# Patient Record
Sex: FEMALE | Race: WHITE | NOT HISPANIC OR LATINO | URBAN - METROPOLITAN AREA
[De-identification: names, ages, dates, MRNs, and addresses within clinical notes are randomized per-mention and may not be internally consistent; named-entity substitution may affect disease eponyms.]

---

## 2017-01-04 ENCOUNTER — TRANSFERRED RECORDS (OUTPATIENT)
Dept: HEALTH INFORMATION MANAGEMENT | Facility: CLINIC | Age: 32
End: 2017-01-04

## 2017-08-29 ENCOUNTER — COMMUNICATION - HEALTHEAST (OUTPATIENT)
Dept: SCHEDULING | Facility: CLINIC | Age: 32
End: 2017-08-29

## 2018-09-14 ENCOUNTER — TRANSFERRED RECORDS (OUTPATIENT)
Dept: HEALTH INFORMATION MANAGEMENT | Facility: CLINIC | Age: 33
End: 2018-09-14

## 2018-09-26 ENCOUNTER — TRANSFERRED RECORDS (OUTPATIENT)
Dept: HEALTH INFORMATION MANAGEMENT | Facility: CLINIC | Age: 33
End: 2018-09-26

## 2019-02-17 ENCOUNTER — COMMUNICATION - HEALTHEAST (OUTPATIENT)
Dept: SCHEDULING | Facility: CLINIC | Age: 34
End: 2019-02-17

## 2019-11-18 ENCOUNTER — COMMUNICATION - HEALTHEAST (OUTPATIENT)
Dept: FAMILY MEDICINE | Facility: CLINIC | Age: 34
End: 2019-11-18

## 2020-06-23 ENCOUNTER — TRANSFERRED RECORDS (OUTPATIENT)
Dept: HEALTH INFORMATION MANAGEMENT | Facility: CLINIC | Age: 35
End: 2020-06-23

## 2020-06-23 ENCOUNTER — RECORDS - HEALTHEAST (OUTPATIENT)
Dept: ADMINISTRATIVE | Facility: OTHER | Age: 35
End: 2020-06-23

## 2020-07-03 ENCOUNTER — HOSPITAL ENCOUNTER (OUTPATIENT)
Dept: NUCLEAR MEDICINE | Facility: CLINIC | Age: 35
Discharge: HOME | End: 2020-07-03
Attending: INTERNAL MEDICINE

## 2020-07-03 ENCOUNTER — TRANSFERRED RECORDS (OUTPATIENT)
Dept: HEALTH INFORMATION MANAGEMENT | Facility: CLINIC | Age: 35
End: 2020-07-03

## 2020-07-03 DIAGNOSIS — K31.9 GASTROPATHY: ICD-10-CM

## 2020-07-09 ENCOUNTER — TRANSFERRED RECORDS (OUTPATIENT)
Dept: HEALTH INFORMATION MANAGEMENT | Facility: CLINIC | Age: 35
End: 2020-07-09

## 2020-07-16 ENCOUNTER — TRANSFERRED RECORDS (OUTPATIENT)
Dept: HEALTH INFORMATION MANAGEMENT | Facility: CLINIC | Age: 35
End: 2020-07-16

## 2020-07-23 ENCOUNTER — TRANSFERRED RECORDS (OUTPATIENT)
Dept: HEALTH INFORMATION MANAGEMENT | Facility: CLINIC | Age: 35
End: 2020-07-23

## 2020-07-23 ENCOUNTER — MEDICAL CORRESPONDENCE (OUTPATIENT)
Dept: HEALTH INFORMATION MANAGEMENT | Facility: CLINIC | Age: 35
End: 2020-07-23

## 2020-07-27 ENCOUNTER — TELEPHONE (OUTPATIENT)
Dept: GASTROENTEROLOGY | Facility: CLINIC | Age: 35
End: 2020-07-27

## 2020-07-28 NOTE — TELEPHONE ENCOUNTER
RECORDS RECEIVED FROM: N/A   DATE RECEIVED: 2020   NOTES STATUS DETAILS   OFFICE NOTE from referring provider N/A    OFFICE NOTE from other specialist Received/ Care Everywhere 2020 Nurse Triage ([facility])   3/31/2020, 2020 Office visit with Dr. Carmen Crabtree ([facility])     [facility]:  - 2020, 18 Office visit with Dr. Chacorta Michelle   - 11/3/17 Office visit with Dr. Sebastian Mcguire   - 17 Office visit with Cyndee Ignacio PA-C  - 16 Office visit with Dr. Cheryl Garcia   - 10/26/19 Office visit with Dr. Keesha Pimentel    DISCHARGE SUMMARY from hospital Care Everywhere 2020, 18, 18, 10/29/17 ([facility])- more ED visits in Care Everywhere  2020 ([facility])    OPERATIVE REPORT Care Everywhere/ In process    MEDICATION LIST Care Everywhere         ENDOSCOPY  Received EGD: 2020, 18, 17 ([facility])    COLONOSCOPY Care Everywhere/ Received  17 ([facility])      ERCP N/A    EUS N/A    STOOL TESTING Care Everywhere 2020   PERTINENT LABS Care Everywhere    PATHOLOGY REPORTS (RELATED) Received 18, 17 ([facility])    IMAGING (CT, MRI, EGD) Care Everywhere [facility]:  - CTA Abdomen Pelvis: 2020  - NM Gastric Emptyin/3/2020  - US Abdomen: 2020, 18, 18  - CT Abdomen Pelvis: 18, 18, 18, 18  - CT Abdomen Pelvis Enterography: 10/28/15    [facility]:  - CT Abdomen Pelvis: 2020    [facility]:  - CT Abdomen Pelvis: 2020     REFERRAL INFORMATION    Date referral was placed: 2020   Date all records received: N/A   Date records were scanned into Epic: N/A   Date records were sent to Provider to review: N/A   Date and recommendation received from provider:  LETTER SENT  SCHEDULE APPOINTMENT   Date patient was contacted to schedule: 2020     Action 2020 10:31am -Lupe   Action Taken Fax request sent to [facility] ([phone]) for recs.     3:41pm Recs received from  [facility] and sent to scan.

## 2020-08-17 ENCOUNTER — VIRTUAL VISIT (OUTPATIENT)
Dept: GASTROENTEROLOGY | Facility: CLINIC | Age: 35
End: 2020-08-17
Payer: COMMERCIAL

## 2020-08-17 ENCOUNTER — PRE VISIT (OUTPATIENT)
Dept: GASTROENTEROLOGY | Facility: CLINIC | Age: 35
End: 2020-08-17

## 2020-08-17 VITALS — WEIGHT: 293 LBS | HEIGHT: 72 IN | BODY MASS INDEX: 39.68 KG/M2

## 2020-08-17 DIAGNOSIS — R10.84 ABDOMINAL PAIN, GENERALIZED: ICD-10-CM

## 2020-08-17 DIAGNOSIS — R16.2 HEPATOSPLENOMEGALY: Primary | ICD-10-CM

## 2020-08-17 RX ORDER — MECLIZINE HYDROCHLORIDE 25 MG/1
25 TABLET ORAL
COMMUNITY
Start: 2020-01-31

## 2020-08-17 RX ORDER — ACETAMINOPHEN 500 MG
1000 TABLET ORAL
COMMUNITY
Start: 2019-02-25

## 2020-08-17 RX ORDER — MAGNESIUM OXIDE 400 MG/1
400 TABLET ORAL
COMMUNITY
Start: 2020-07-06

## 2020-08-17 RX ORDER — HYDROXYZINE HYDROCHLORIDE 25 MG/1
25-50 TABLET, FILM COATED ORAL
COMMUNITY
Start: 2019-11-07

## 2020-08-17 RX ORDER — GLIPIZIDE 10 MG/1
10 TABLET, FILM COATED, EXTENDED RELEASE ORAL
COMMUNITY
Start: 2020-07-14

## 2020-08-17 RX ORDER — PIOGLITAZONEHYDROCHLORIDE 30 MG/1
30 TABLET ORAL
COMMUNITY
Start: 2019-03-18

## 2020-08-17 RX ORDER — PEN NEEDLE, DIABETIC 32 GX 1/4"
NEEDLE, DISPOSABLE MISCELLANEOUS
COMMUNITY
Start: 2020-08-03

## 2020-08-17 RX ORDER — INSULIN PUMP SYRINGE, 3 ML
EACH MISCELLANEOUS
COMMUNITY
Start: 2020-01-14

## 2020-08-17 RX ORDER — NICOTINE 21 MG/24HR
1 PATCH, TRANSDERMAL 24 HOURS TRANSDERMAL
COMMUNITY
Start: 2019-11-06 | End: 2021-01-07

## 2020-08-17 RX ORDER — DEXTROMETHORPHAN HBR. AND GUAIFENESIN 10; 100 MG/5ML; MG/5ML
SOLUTION ORAL
COMMUNITY
Start: 2020-03-25 | End: 2021-01-07

## 2020-08-17 RX ORDER — ALLOPURINOL 300 MG/1
300 TABLET ORAL
COMMUNITY
Start: 2019-03-18

## 2020-08-17 RX ORDER — METFORMIN HCL 500 MG
1000 TABLET, EXTENDED RELEASE 24 HR ORAL
COMMUNITY
Start: 2019-03-18

## 2020-08-17 RX ORDER — METOPROLOL SUCCINATE 25 MG/1
25 TABLET, EXTENDED RELEASE ORAL
COMMUNITY
Start: 2019-11-01

## 2020-08-17 RX ORDER — BLOOD SUGAR DIAGNOSTIC
STRIP MISCELLANEOUS
COMMUNITY
Start: 2020-01-17

## 2020-08-17 RX ORDER — MAGNESIUM HYDROXIDE/ALUMINUM HYDROXICE/SIMETHICONE 120; 1200; 1200 MG/30ML; MG/30ML; MG/30ML
30 SUSPENSION ORAL
COMMUNITY
Start: 2020-03-31

## 2020-08-17 RX ORDER — IBUPROFEN 600 MG/1
1 TABLET ORAL
COMMUNITY
Start: 2020-02-17

## 2020-08-17 RX ORDER — LURASIDONE HYDROCHLORIDE 40 MG/1
TABLET, FILM COATED ORAL
COMMUNITY
Start: 2020-07-22

## 2020-08-17 RX ORDER — SUCRALFATE 1 G/1
1 TABLET ORAL
COMMUNITY
Start: 2020-06-23

## 2020-08-17 RX ORDER — ALBUTEROL SULFATE 90 UG/1
1-2 AEROSOL, METERED RESPIRATORY (INHALATION)
COMMUNITY
Start: 2020-08-17

## 2020-08-17 RX ORDER — DIPHENHYDRAMINE HCL 25 MG
25 TABLET ORAL
COMMUNITY
End: 2021-01-07

## 2020-08-17 RX ORDER — LANOLIN ALCOHOL/MO/W.PET/CERES
3 CREAM (GRAM) TOPICAL
COMMUNITY
Start: 2019-02-15 | End: 2021-01-07

## 2020-08-17 RX ORDER — BLOOD SUGAR DIAGNOSTIC
1 STRIP MISCELLANEOUS
COMMUNITY
Start: 2019-02-15

## 2020-08-17 RX ORDER — PANTOPRAZOLE SODIUM 20 MG/1
20 TABLET, DELAYED RELEASE ORAL
COMMUNITY
Start: 2020-06-08

## 2020-08-17 RX ORDER — LAMOTRIGINE 100 MG/1
100 TABLET ORAL
COMMUNITY
Start: 2019-03-18

## 2020-08-17 RX ORDER — DEXTROMETHORPHAN POLISTIREX 30 MG/5ML
60 SUSPENSION ORAL
COMMUNITY
Start: 2020-03-25 | End: 2021-01-07

## 2020-08-17 RX ORDER — FAMOTIDINE 40 MG/1
20 TABLET, FILM COATED ORAL
COMMUNITY
Start: 2020-06-11

## 2020-08-17 RX ORDER — PRAZOSIN HYDROCHLORIDE 1 MG/1
1 CAPSULE ORAL
COMMUNITY
Start: 2020-02-27

## 2020-08-17 RX ORDER — OXYCODONE HYDROCHLORIDE 5 MG/1
5 TABLET ORAL
COMMUNITY
Start: 2019-11-13

## 2020-08-17 RX ORDER — ONDANSETRON 8 MG/1
8 TABLET, ORALLY DISINTEGRATING ORAL
COMMUNITY
Start: 2020-07-06

## 2020-08-17 SDOH — HEALTH STABILITY: MENTAL HEALTH: HOW OFTEN DO YOU HAVE 6 OR MORE DRINKS ON ONE OCCASION?: MONTHLY

## 2020-08-17 SDOH — HEALTH STABILITY: MENTAL HEALTH: HOW OFTEN DO YOU HAVE A DRINK CONTAINING ALCOHOL?: 2-4 TIMES A MONTH

## 2020-08-17 SDOH — HEALTH STABILITY: MENTAL HEALTH: HOW MANY STANDARD DRINKS CONTAINING ALCOHOL DO YOU HAVE ON A TYPICAL DAY?: 3 OR 4

## 2020-08-17 ASSESSMENT — MIFFLIN-ST. JEOR: SCORE: 2503.92

## 2020-08-17 ASSESSMENT — PAIN SCALES - GENERAL: PAINLEVEL: EXTREME PAIN (9)

## 2020-08-17 NOTE — LETTER
Date:August 21, 2020      Patient was self referred, no letter generated. Do not send.        [facility] Health Information

## 2020-08-17 NOTE — NURSING NOTE
Chief Complaint   Patient presents with     Consult     Nausea and vomiting, EGD 7/16/20       Vitals:    08/17/20 1407   Weight: (!) 169.2 kg (373 lb)   Height: 1.829 m (6')       Body mass index is 50.59 kg/m .      Camille Leonardo LPN

## 2020-08-17 NOTE — LETTER
"    8/17/2020         RE: Darryl Judd  [address] [place] [address]        Dear Colleague,    Thank you for referring your patient, Darryl Judd, to the [facility]. Please see a copy of my visit note below.    Darryl Judd is a 35 year old female who is being evaluated via a billable telephone visit.      The patient has been notified of following:     \"This telephone visit will be conducted via a call between you and your physician/provider. We have found that certain health care needs can be provided without the need for a physical exam.  This service lets us provide the care you need with a short phone conversation.  If a prescription is necessary we can send it directly to your pharmacy.  If lab work is needed we can place an order for that and you can then stop by our lab to have the test done at a later time.    Telephone visits are billed at different rates depending on your insurance coverage. During this emergency period, for some insurers they may be billed the same as an in-person visit.  Please reach out to your insurance provider with any questions.    If during the course of the call the physician/provider feels a telephone visit is not appropriate, you will not be charged for this service.\"    Patient has given verbal consent for Telephone visit?  Yes    What phone number would you like to be contacted at? [phone]     How would you like to obtain your AVS? MyChart    During this virtual visit the patient is located in [place], patient verifies this as the location during the entirety of this visit.     Phone call duration: 23 minutes    Chino Ruffin MD      Gastroenterology Consult [facility]             Reason for Consultation:       Chief complaint: upper abdominal pain, nausea and vomiting.           ASSESSMENT AND RECOMMENDATIONS:   Assessment:  35 year old female with a history of abdominal pain, nausea, and vomiting, weight loss, morbid " "obesity, markedly enlarged liver on CT, EGD previously in early July showed retained food.  NM GES abnormal on Narcotics.     Recommendations:  Repeat EGD after 124 hour liquid fast.  Consider post viral gastroparesis vs severe fatty liver causing symptoms  Continue attempts to lose weight    Seek counseling for support for social changes  Continue therapy and medications for depression.             History of Present Illness:   Darryl Judd is a 35 year old female with a history of cervical disk disease on oxycodone, who developed new onset nausea vomiting associated with generalized upper abdominal pain.  The pain is severe, constant, made worse with eating, made better by nothing n particular.  She has lost, she estimates, about 15 lbs, but she is greater than 350 lbs at 6' 1\"- BMI 46.2.  She tells me she will not go back to the prior MD at [facility] since he \"nicked\" her when he did her EGD in July.  Indeed she was seen in the ED 4 times and in UC on one occasion over the last month.  She was complaining of melena after her was found to be FOBT negative and to have a preserved hemoglobin and at no time really dropped hemoglobin in a way that would be consisted with black stool from bleeding.  She also complained of pain at each of those visits.  She tells me she uses oxycodone for neck pain after an accident and that there are plans in place to fix her neck.  She has had diabetes II for 6-7 years and has recent AIC ranges from 6.8-7.8. She has a history of idiopathic DVT/PE and was maintained on DOAC this occurred in 11/2019.  She has stopped this and apparently \"doesn't have to take this medicine anymore\". It was recommended (see below) that she follow up with another EGD to complete the exam.     Her liver is markedly enlarge at 24 cm.  This appears to be fatty infiltration.   This has not been evaluated.      She smokes cigarretts and uses excedrin about 3 x a week.         EGD 07/16/2020   Attending MD: Aung " PAUL Lynn MD Medical Record #: [id number]  ______________________________________________________________________________________________  Procedure:    Upper GI Endoscopy   Indications:    Abdominal Pain, failure to respond to treatment   Nausea or Vomiting  Provider:        Aung Lynn MD   Referring MD: Referral Self   Primary MD:      Carmen Crabtree MD  Medications:  Admitting Medication:   0.9% Normal Saline at TKO   Intra Procedure Medications:   Patient received monitored anesthesia care.   Recovery Medications:   ondansetron hydrochloride (Zofran) 4 mg by SL   Complications: No immediate complications  ______________________________________________________________________________________________  Procedure:   An examination of the heart and lungs was performed within acceptable limits.  The patient was therefore deemed a reasonable candidate for endoscopy and monitored anesthesia care.  The risks and benefits were explained to the patient, who appeared to understand. After obtaining informed consent, the patient received monitored anesthesia care and I passed the scope.   Throughout the procedure the patient's blood pressure, pulse and oxygen saturations were monitored.  The scope was introduced through the mouth and advanced to the stomach.         Findings:   Esophagus:  Normal esophagus.   The z-line is 40 centimeters from the incisors.  Top of the gastric folds is 40 centimeters from the incisors.  Stomach:  The diaphragm hiatus is at 40 centimeters from the incisors.  *Stomach Comments:  Large amount of food debris and bile throughout the entire stomach precluding visualization.  Findings may represent some degree of gastroparesis or outlet obstruction. Procedure aborted.    Impression:   Nausea and vomiting, intractability of vomiting not specified, unspecified vomiting type  MD Impression Comments:    -Repeat EGD at the earliest convenience.  -Low fiber diet.  -Liquid diet ONLY for the entire  day prior to the endoscopy.       CT ABDOMEN AND PELVIS WITHOUT AND WITH CONTRAST  DATE/TIME: 7/24/2020 1:11 AM    INDICATION: Abdominal pain. Gastrointestinal bleeding. Black stool.    COMPARISON: 2/9/2020.    TECHNIQUE: CT abdomen and pelvis prior to the administration of intravenous contrast. CT angiogram abdomen and pelvis following the injection of IV contrast during arterial and renal excretory phases. 2D and 3D MIP reconstructions were performed by the   CT technologist. Dose reduction techniques were used.    CONTRAST: 100 mL iohexol.    FINDINGS:  GASTROINTESTINAL TRACT: The stomach, small and large bowel are nondilated. The appendix is unremarkable. No visualized bowel wall thickening, pneumatosis or free intraperitoneal gas. No visualized gastrointestinal bleeding.    LOWER CHEST: Unremarkable.    HEPATOBILIARY: Unremarkable.    SPLEEN: Mild splenomegaly. The spleen measures 19 cm in craniocaudal dimension.    PANCREAS: Unremarkable.    ADRENAL GLANDS: Indeterminate 2.0 cm left adrenal nodule measuring 20 Hounsfield units on unenhanced images 2/9/2020, unchanged.    KIDNEYS/BLADDER: A tiny focus of gas is present in the urinary bladder lumen.    LYMPH NODES: Unremarkable.              CT ABDOMEN PELVIS W   7/17/2020 12:44 AM    INDICATION: Abdominal pain.    COMPARISON: 2/9/2020.    TECHNIQUE: CT scan of the abdomen and pelvis was performed following injection  of IV contrast. Multiplanar reformats were obtained. Dose reduction techniques  were used.    CONTRAST: Iohexol 350 mg iodine/mL  mL bottle: 100 mL.    FINDINGS:   LOWER CHEST: Trace pericardial fluid or thickening also present previously.    HEPATOBILIARY: Enlarged liver, 24 cm craniocaudad with diffuse fatty  infiltration. No radiopaque gallstones or bile duct dilatation.    PANCREAS: Within normal limits.    SPLEEN: Enlarged measuring 19.5 cm craniocaudad. No focal lesion.    ADRENAL GLANDS: Normal.    KIDNEYS/BLADDER: Within normal  limits. No hydronephrosis.    BOWEL: No obstruction or pneumatosis. There is some nonspecific fluid in the  colon. No free air or ascites.    LYMPH NODES: Normal.    VASCULATURE: Unremarkable. No abdominal aortic aneurysm.    PELVIC ORGANS: Normal.    MUSCULOSKELETAL: Mild degenerative changes visualized spine.    IMPRESSION:   1.  Some nonspecific fluid in the colon could be due to enteritis. Correlate  clinically.    2.  No other acute findings.    3.  Hepatosplenomegaly. Hepatic steatosis.       NM GASTRIC EMPTYING  DATE/TIME: 7/3/2020 12:48 PM    INDICATION: Disease of stomach and duodenum, unspecified  COMPARISON: None.  TECHNIQUE: 1.0 mCi of technetium-99m sulfur colloid labeled egg product. Oral ingestion of standard meal. Anterior and posterior planar imaging for four hours. Geometric mean of emptying/retention calculated.    FINDINGS: The stomach is scintigraphically normal. No evidence of gastroesophageal reflux.    PATIENT'S RETENTION: 1 hour = 85%, 2 hours = 82%, 4 hours = 59%  NORMAL RETENTION: 1 hour = <90%, 2 hours = <60%, 4 hours = <10%                 Past Medical History:     History of pulmonary embolus (PE) 02/06/2020 November, 2019     Borderline personality disorder 06/25/2018   Violent behavior 10/12/2017   constipation 09/02/2017   Bipolar disorder, mixed 09/01/2017   Type 2 diabetes mellitus with hyperglycemia, with long-term current use of insulin 01/09/2017   Fatty liver disease, nonalcoholic 11/30/2016   Adenoma of left adrenal gland 11/21/2016   Overview:     Noted incidental on CT scan. 1.2 cm nodule, < 10 HU. Saw Dr. Ortiz; hormone testing all neg, advised no follow up necessary for imaging or testing.   Microalbuminuria 02/26/2015   Vitamin D deficiency 02/18/2015   Hyperuricemia 01/09/2015   Gout 01/09/2015   Tobacco use disorder 05/03/2012   Morbidly obese 02/09/2012   Hypertriglyceridemia 08/01/2011   Hypertension 05/04/2010   Mild mental retardation               Past  Surgical History:     No past surgical history on file.         Previous Endoscopy:   No results found for this or any previous visit.         Social History:     Social History     Socioeconomic History     Marital status: Single     Spouse name: None     Number of children: None     Years of education: None     Highest education level: None   Occupational History     None   Social Needs     Financial resource strain: None     Food insecurity     Worry: None     Inability: None     Transportation needs     Medical: None     Non-medical: None   Tobacco Use     Smoking status: Current Every Day Smoker     Packs/day: 0.50     Types: Cigarettes     Start date: 1/1/2003     Smokeless tobacco: Never Used   Substance and Sexual Activity     Alcohol use: Yes     Alcohol/week: 1.0 - 3.0 standard drinks     Types: 1 - 3 Shots of liquor per week     Frequency: 2-4 times a month     Drinks per session: 3 or 4     Binge frequency: Monthly     Drug use: Not Currently     Sexual activity: None   Lifestyle     Physical activity     Days per week: None     Minutes per session: None     Stress: None   Relationships     Social connections     Talks on phone: None     Gets together: None     Attends [employer] service: None     Active member of club or organization: None     Attends meetings of clubs or organizations: None     Relationship status: None     Intimate partner violence     Fear of current or ex partner: None     Emotionally abused: None     Physically abused: None     Forced sexual activity: None   Other Topics Concern     None   Social History Narrative     None            Family History:     No family history on file.  No known history of colorectal cancer, liver disease, or inflammatory bowel disease.         Allergies:   Reviewed and edited as appropriate     Allergies   Allergen Reactions     Diazepam Other (See Comments)     Blackout, memory loss       Buspirone Other (See Comments)     Suicidal - acted on it         Canagliflozin Other (See Comments)     Gabapentin Dizziness     Lorazepam Other (See Comments)     agitation  angry   overdose       Metformin Other (See Comments)     Too fast a weight loss - felt ill       Vancomycin Other (See Comments), Nausea and Vomiting and GI Disturbance     Abdominal pain         Ciprofloxacin Rash     Metronidazole Rash     Mold Rash     Penicillins Rash     Pollen Extract Rash     Prednisone Other (See Comments) and Rash     Irritability  Urinary retention       Uncaria Tomentosa (Cats Claw) Rash            Medications:     Current Outpatient Medications   Medication Sig Dispense Refill     acetaminophen (TYLENOL) 500 MG tablet Take 1,000 mg by mouth       albuterol (PROAIR HFA/PROVENTIL HFA/VENTOLIN HFA) 108 (90 Base) MCG/ACT inhaler Inhale 1-2 puffs into the lungs       allopurinol (ZYLOPRIM) 300 MG tablet Take 300 mg by mouth       alum & mag hydroxide-simethicone (MAALOX) 200-200-20 MG/5ML SUSP suspension Take 30 mLs by mouth       aspirin-acetaminophen-caffeine (EXCEDRIN MIGRAINE) 250-250-65 MG tablet Take 1 tablet by mouth       blood glucose ([employer] TEST STRIPS) test strip Test 2 times per day.       blood glucose (PRECISION QID TEST) test strip 1 each       Blood Glucose Monitoring Suppl (FIFTY50 GLUCOSE METER 2.0) w/Device KIT Dispense meter, test strips, lancets covered by pt ins. E11.9 NIDDM type II - Test 1 time/day       dextromethorphan (DELSYM) 30 MG/5ML liquid Take 60 mg by mouth       dextromethorphan-guaiFENesin (SILTUSSIN DM WHITE)  MG/5ML liquid        famotidine (PEPCID) 40 MG tablet Take 20 mg by mouth       glipiZIDE (GLUCOTROL XL) 10 MG 24 hr tablet Take 10 mg by mouth       glucagon (GLUCAGON EMERGENCY) 1 MG kit Inject 1 mg Subcutaneous       hydrOXYzine (ATARAX) 25 MG tablet Take 25-50 mg by mouth       insulin detemir (LEVEMIR PEN) 100 UNIT/ML pen Inject 30 Units Subcutaneous       insulin pen needle (FIFTY50 PEN NEEDLES) 32G X 6 MM miscellaneous For  administering insulin at home.       lamoTRIgine (LAMICTAL) 100 MG tablet Take 100 mg by mouth       Lancets 30G MISC Check blood glucose twice daily       lurasidone (LATUDA) 40 MG TABS tablet        magnesium oxide (MAG-OX) 400 MG tablet Take 400 mg by mouth       meclizine (ANTIVERT) 25 MG tablet Take 25 mg by mouth       melatonin 3 MG tablet Take 3 mg by mouth       metFORMIN (GLUCOPHAGE-XR) 500 MG 24 hr tablet Take 1,000 mg by mouth       metoprolol succinate ER (TOPROL-XL) 25 MG 24 hr tablet Take 25 mg by mouth       nicotine (NICODERM CQ) 21 MG/24HR 24 hr patch Place 1 patch onto the skin       ondansetron (ZOFRAN-ODT) 8 MG ODT tab Place 8 mg under the tongue       oxyCODONE (ROXICODONE) 5 MG tablet Take 5 mg by mouth       pantoprazole (PROTONIX) 20 MG EC tablet Take 20 mg by mouth       pioglitazone (ACTOS) 30 MG tablet Take 30 mg by mouth       prazosin (MINIPRESS) 1 MG capsule Take 1 mg by mouth       rivaroxaban ANTICOAGULANT (XARELTO) 20 MG TABS tablet Take 20 mg by mouth       sitagliptin (JANUVIA) 100 MG tablet Take 100 mg by mouth       sucralfate (CARAFATE) 1 GM tablet Take 1 g by mouth       Wound Dressings (MEDIHONEY WOUND/BURN DRESSING) gel Apply thin layer to wound once a day.       diphenhydrAMINE (BENADRYL) 25 MG tablet Take 25 mg by mouth               Review of Systems:     A complete 10 point review of systems was performed and is negative except as noted in the HPI           Physical Exam:   Ht 1.829 m (6')   Wt (!) 169.2 kg (373 lb)   BMI 50.59 kg/m    Wt:   Wt Readings from Last 2 Encounters:   08/17/20 (!) 169.2 kg (373 lb)      Constitutional: cooperative, pleasant, not dyspneic/diaphoretic, no acute distress  Sounds depressed, flattened affect.           Chino Ruffin MD  Associate Professor of Medicine  Division of Gastroenterology, Hepatology, and Nutrition  [facility]      Again, thank you for allowing me to participate in the care of your patient.         Sincerely,        Chino Ruffin MD

## 2020-10-15 DIAGNOSIS — M54.9 ACUTE BACK PAIN: Primary | ICD-10-CM

## 2020-10-16 ENCOUNTER — TELEPHONE (OUTPATIENT)
Dept: NEUROSURGERY | Facility: CLINIC | Age: 35
End: 2020-10-16

## 2020-10-19 ENCOUNTER — VIRTUAL VISIT (OUTPATIENT)
Dept: NEUROSURGERY | Facility: CLINIC | Age: 35
End: 2020-10-19
Payer: COMMERCIAL

## 2020-10-19 ENCOUNTER — PRE VISIT (OUTPATIENT)
Dept: NEUROSURGERY | Facility: CLINIC | Age: 35
End: 2020-10-19

## 2020-10-19 DIAGNOSIS — E66.9 OBESITY, UNSPECIFIED CLASSIFICATION, UNSPECIFIED OBESITY TYPE, UNSPECIFIED WHETHER SERIOUS COMORBIDITY PRESENT: Primary | ICD-10-CM

## 2020-10-19 PROCEDURE — 99204 OFFICE O/P NEW MOD 45 MIN: CPT | Mod: 95 | Performed by: NURSE PRACTITIONER

## 2020-10-19 NOTE — PROGRESS NOTES
"Darryl Judd is a 35 year-old female who is being evaluated via a billable video visit.   This is a video/telephone visit conducted due to [facility] on non-urgent clinic visits due to risk of the spread of COVID-19 pandemic virus. The patient did not identify any urgent or red-flag symptoms that would require in-person evaluation.       The patient has been notified of following:     \"This video visit will be conducted via a call between you and your physician/provider. We have found that certain health care needs can be provided without the need for an in-person physical exam.  This service lets us provide the care you need with a video conversation.  If a prescription is necessary we can send it directly to your pharmacy.  If lab work is needed we can place an order for that and you can then stop by our lab to have the test done at a later time.    Video visits are billed at different rates depending on your insurance coverage.  Please reach out to your insurance provider with any questions.    If during the course of the call the physician/provider feels a video visit is not appropriate, you will not be charged for this service.\"    Patient has given verbal consent for Video visit?               YES   How would you like to obtain your AVS?        My Chart   If you are dropped from the video visit, the video invite should be resent to:   Cell phone   Will anyone else be joining your video visit?          No        Video-Visit Details    Type of service:  Video Visit    Video Duration    Approx 20 minutes     Originating Location (pt. Location):         Home     Distant Location (provider location):  [facility] NEUROSURGERY [facility]     Platform used for Video Visit:      Am Well       Reason for visit:                Neck pain     History of present illness:  Darryl Judd is a 35 year old female with past medical history of borderline personality, " bipolar, morbid obesity, non-alcoholic fatty liver disease, diabetes, and adenoma of left adrenal gland who is seen for her neck pain.   Neck pain -   Constant.   Limits her ability to turn her head.   She has pain that radiates down her right arm into the last two digits of right arm.  She has associated numbness/tingling.   He pain /symptoms are worse at night   Left arm/hands and fingers are asymptomatic  She underwent an EMG about a year ago.  She recalls that this was negative.        Pain Relevant Medications:   Opiates:  Yes. PCP  Oxycodone  3-4 /day   NSAIDS:   Tylenol.  Contraindicated to NSAIDS due to kidney    Muscle Relaxants: Yes.  Tried several. Not helpful   Anti-depressants:    Neuropathics:       Gabapentin-  Side effects /not Helpful     Topicals:   Yes.  Topical cream-  Helpful   Other medications not covered above:      Past Pain Treatments:   Pain Clinic:     Yes.  Not helpful   PT:    Yes.  Last year. Did a course of session. Made pain worse   Chiropractor:   No  Acupuncture:   No  TENs Unit:  Yes-   Helpful for a short time   Injections:   Yes.  Approx 3 injections.  These caused her to get dizzy, and fall down and she was unable to stand. This occurred with each injection   Surgeries related to pain:  No      Review of systems: 10 point ROS negative except for as detailed in HPI  Past Medical History:     Anal fissure 12/19/2018     Borderline personality disorder (HC) 06/25/2018     Sprain of acromioclavicular ligament of right shoulder 11/14/2017     Violent behavior 10/12/2017     Bipolar disorder, mixed (HC) 09/01/2017     Type 2 diabetes mellitus with hyperglycemia, with long-term current use of insulin (HC) 01/09/2017     Fatty liver disease, nonalcoholic 11/30/2016     Adenoma of left adrenal gland 11/21/2016   Noted incidental on CT scan. 1.2 cm nodule, < 10 HU. Saw Dr. Ortiz; hormone testing all neg, advised no follow up necessary for imaging or testing.    Surgical History:    Bunion  Bilateral knees           Medications:  Current Outpatient Medications   Medication     acetaminophen (TYLENOL) 500 MG tablet     albuterol (PROAIR HFA/PROVENTIL HFA/VENTOLIN HFA) 108 (90 Base) MCG/ACT inhaler     allopurinol (ZYLOPRIM) 300 MG tablet     alum & mag hydroxide-simethicone (MAALOX) 200-200-20 MG/5ML SUSP suspension     glipiZIDE (GLUCOTROL XL) 10 MG 24 hr tablet     hydrOXYzine (ATARAX) 25 MG tablet     insulin detemir (LEVEMIR PEN) 100 UNIT/ML pen     lamoTRIgine (LAMICTAL) 100 MG tablet     lurasidone (LATUDA) 40 MG TABS tablet     melatonin 3 MG tablet     metFORMIN (GLUCOPHAGE-XR) 500 MG 24 hr tablet     metoprolol succinate ER (TOPROL-XL) 25 MG 24 hr tablet     oxyCODONE (ROXICODONE) 5 MG tablet     pantoprazole (PROTONIX) 20 MG EC tablet     pioglitazone (ACTOS) 30 MG tablet     prazosin (MINIPRESS) 1 MG capsule     rivaroxaban ANTICOAGULANT (XARELTO) 20 MG TABS tablet     sitagliptin (JANUVIA) 100 MG tablet     sucralfate (CARAFATE) 1 GM tablet     No current facility-administered medications for this visit.        Social History     Tobacco Use     Smoking status: Current Every Day Smoker     Packs/day: 1 pack/day     Types: Cigarettes     Start date: 1/1/2003     Smokeless tobacco: Never Used   Substance Use Topics     Alcohol use: Yes     Alcohol/week: 1.0 - 3.0 standard drinks     Types: 1 - 3 Shots of liquor per week     Frequency: 2-4 times a month     Drinks per session: 3 or 4     Binge frequency: Monthly     Drug use: Not Currently     Family History:  Mother with failed lumbar spine surgery      Physical exam:         BMI  50.59   There were no vitals taken for this visit.    General    Alert, cooperative.  No acute distress  Pulmonary:   Breathing comfortably on room air. No cough, or shortness of breath  Skin:    Visible skin without lesions or obvious rash  Speech is fluent  Maintains eye contact  Musculoskeletal:    Moving extremities freely with good  strength      Imaging:  Cervical spine MRI scanned in [facility]   Dated 7/23/20       Assessment:  ~Neck pain      Plan:  ~Reviewed case history and imaging with spine neurosurgeon Dr. Roman White  ~Patient given referral to Medical Weight Management for Weight loss   (BMI >50)   ~Encouraged smoking cessation and patient to be smoke-free for 3 months prior to consideration for cervical spine surgery   ~Continue with non-surgical, conservative treatments for neck pain       At the end of the visit, all the patient's questions and concerns had been addressed and the patient was agreeable with the plan of care as outlined above. The patient has our office contact information at [phone], and knows to call with any questions, concerns, or changes in condition.        Tabatha Fournier DNP  Neurosurgery Nurse Practitioner  [facility]  [phone]

## 2020-10-19 NOTE — LETTER
"10/19/2020       RE: Darryl Judd  [address]  [place] [address]     Dear Colleague,    Thank you for referring your patient, Darryl Judd, to the [facility] [place] [facility]. Please see a copy of my visit note below.    Darryl Judd is a 35 year-old female who is being evaluated via a billable video visit.   This is a video/telephone visit conducted due to [facility] on non-urgent clinic visits due to risk of the spread of COVID-19 pandemic virus. The patient did not identify any urgent or red-flag symptoms that would require in-person evaluation.       The patient has been notified of following:     \"This video visit will be conducted via a call between you and your physician/provider. We have found that certain health care needs can be provided without the need for an in-person physical exam.  This service lets us provide the care you need with a video conversation.  If a prescription is necessary we can send it directly to your pharmacy.  If lab work is needed we can place an order for that and you can then stop by our lab to have the test done at a later time.    Video visits are billed at different rates depending on your insurance coverage.  Please reach out to your insurance provider with any questions.    If during the course of the call the physician/provider feels a video visit is not appropriate, you will not be charged for this service.\"    Patient has given verbal consent for Video visit?               YES   How would you like to obtain your AVS?        My Chart   If you are dropped from the video visit, the video invite should be resent to:   Cell phone   Will anyone else be joining your video visit?          No        Video-Visit Details    Type of service:  Video Visit    Video Duration    Approx 20 minutes     Originating Location (pt. Location):         Home "     Distant Location (provider location):  [facility] NEUROSURGERY CLINIC [place]     Platform used for Video Visit:      Am Well       Reason for visit:                Neck pain     History of present illness:  Darryl Judd is a 35 year old female with past medical history of borderline personality, bipolar, morbid obesity, non-alcoholic fatty liver disease, diabetes, and adenoma of left adrenal gland who is seen for her neck pain.   Neck pain -   Constant.   Limits her ability to turn her head.   She has pain that radiates down her right arm into the last two digits of right arm.  She has associated numbness/tingling.   He pain /symptoms are worse at night   Left arm/hands and fingers are asymptomatic  She underwent an EMG about a year ago.  She recalls that this was negative.        Pain Relevant Medications:   Opiates:  Yes. PCP  Oxycodone  3-4 /day   NSAIDS:   Tylenol.  Contraindicated to NSAIDS due to kidney    Muscle Relaxants: Yes.  Tried several. Not helpful   Anti-depressants:    Neuropathics:       Gabapentin-  Side effects /not Helpful     Topicals:   Yes.  Topical cream-  Helpful   Other medications not covered above:      Past Pain Treatments:   Pain Clinic:     Yes.  Not helpful   PT:    Yes.  Last year. Did a course of session. Made pain worse   Chiropractor:   No  Acupuncture:   No  TENs Unit:  Yes-   Helpful for a short time   Injections:   Yes.  Approx 3 injections.  These caused her to get dizzy, and fall down and she was unable to stand. This occurred with each injection   Surgeries related to pain:  No      Review of systems: 10 point ROS negative except for as detailed in HPI  Past Medical History:     Anal fissure 12/19/2018     Borderline personality disorder (HC) 06/25/2018     Sprain of acromioclavicular ligament of right shoulder 11/14/2017     Violent behavior 10/12/2017     Bipolar disorder, mixed (HC) 09/01/2017     Type 2 diabetes mellitus with hyperglycemia, with  long-term current use of insulin (HC) 01/09/2017     Fatty liver disease, nonalcoholic 11/30/2016     Adenoma of left adrenal gland 11/21/2016   Noted incidental on CT scan. 1.2 cm nodule, < 10 HU. Saw Dr. Ortiz; hormone testing all neg, advised no follow up necessary for imaging or testing.    Surgical History:   Bunion  Bilateral knees           Medications:  Current Outpatient Medications   Medication     acetaminophen (TYLENOL) 500 MG tablet     albuterol (PROAIR HFA/PROVENTIL HFA/VENTOLIN HFA) 108 (90 Base) MCG/ACT inhaler     allopurinol (ZYLOPRIM) 300 MG tablet     alum & mag hydroxide-simethicone (MAALOX) 200-200-20 MG/5ML SUSP suspension     glipiZIDE (GLUCOTROL XL) 10 MG 24 hr tablet     hydrOXYzine (ATARAX) 25 MG tablet     insulin detemir (LEVEMIR PEN) 100 UNIT/ML pen     lamoTRIgine (LAMICTAL) 100 MG tablet     lurasidone (LATUDA) 40 MG TABS tablet     melatonin 3 MG tablet     metFORMIN (GLUCOPHAGE-XR) 500 MG 24 hr tablet     metoprolol succinate ER (TOPROL-XL) 25 MG 24 hr tablet     oxyCODONE (ROXICODONE) 5 MG tablet     pantoprazole (PROTONIX) 20 MG EC tablet     pioglitazone (ACTOS) 30 MG tablet     prazosin (MINIPRESS) 1 MG capsule     rivaroxaban ANTICOAGULANT (XARELTO) 20 MG TABS tablet     sitagliptin (JANUVIA) 100 MG tablet     sucralfate (CARAFATE) 1 GM tablet     No current facility-administered medications for this visit.        Social History     Tobacco Use     Smoking status: Current Every Day Smoker     Packs/day: 1 pack/day     Types: Cigarettes     Start date: 1/1/2003     Smokeless tobacco: Never Used   Substance Use Topics     Alcohol use: Yes     Alcohol/week: 1.0 - 3.0 standard drinks     Types: 1 - 3 Shots of liquor per week     Frequency: 2-4 times a month     Drinks per session: 3 or 4     Binge frequency: Monthly     Drug use: Not Currently     Family History:  Mother with failed lumbar spine surgery      Physical exam:         BMI  50.59   There were no vitals taken for  this visit.    General    Alert, cooperative.  No acute distress  Pulmonary:   Breathing comfortably on room air. No cough, or shortness of breath  Skin:    Visible skin without lesions or obvious rash  Speech is fluent  Maintains eye contact  Musculoskeletal:    Moving extremities freely with good strength      Imaging:  Cervical spine MRI scanned in [facility]   Dated 7/23/20       Assessment:  ~Neck pain      Plan:  ~Reviewed case history and imaging with spine neurosurgeon Dr. Roman White  ~Patient given referral to Medical Weight Management for Weight loss   (BMI >50)   ~Encouraged smoking cessation and patient to be smoke-free for 3 months prior to consideration for cervical spine surgery   ~Continue with non-surgical, conservative treatments for neck pain       At the end of the visit, all the patient's questions and concerns had been addressed and the patient was agreeable with the plan of care as outlined above. The patient has our office contact information at [phone], and knows to call with any questions, concerns, or changes in condition.        Tabatha Fournier DNP  Neurosurgery Nurse Practitioner  [facility]  [phone]                Again, thank you for allowing me to participate in the care of your patient.      Sincerely,    KENY Blas CNP

## 2020-10-19 NOTE — LETTER
Date:October 22, 2020      Patient was self referred, no letter generated. Do not send.        [facility]

## 2020-10-19 NOTE — TELEPHONE ENCOUNTER
SPINE PATIENTS - NEW PROTOCOL PREVISIT    RECORDS RECEIVED FROM: External   Date of Appt: 10/19/20   NOTES (FOR ALL VISITS) STATUS DETAILS   OFFICE NOTE from referring provider Care Everywhere SEE IN PATIENT NOTES   OFFICE NOTE from other specialist N/A    DISCHARGE SUMMARY from hospital N/A    DISCHARGE REPORT from ER Care Everywhere REG[facility]:  10/11/20   EMG REPORT N/A    MEDICATION LIST Care Everywhere    IMAGING  (FOR ALL VISITS)     MRI (HEAD, NECK, SPINE) Received CDI:  MRI Cervical Spine 7/23/20   XRAY (SPINE) *NEUROSURGERY* N/A    CT (HEAD, NECK, SPINE) N/A

## 2020-10-20 NOTE — PATIENT INSTRUCTIONS
~Reviewed case history and imaging with spine neurosurgeon Dr. Roman White  ~Patient given referral to Medical Weight Management for Weight loss   (BMI >50)   ~Encouraged smoking cessation and patient to be smoke-free for 3 months prior to consideration for cervical spine surgery   ~Continue with non-surgical, conservative treatments for neck pain       At the end of the visit, all the patient's questions and concerns had been addressed and the patient was agreeable with the plan of care as outlined above. The patient has our office contact information at [phone], and knows to call with any questions, concerns, or changes in condition.

## 2020-12-18 NOTE — PROGRESS NOTES
"Darryl Judd is a 35 year old female who is being evaluated via a billable telephone visit.      The patient has been notified of following:     \"This telephone visit will be conducted via a call between you and your physician/provider. We have found that certain health care needs can be provided without the need for a physical exam.  This service lets us provide the care you need with a short phone conversation.  If a prescription is necessary we can send it directly to your pharmacy.  If lab work is needed we can place an order for that and you can then stop by our lab to have the test done at a later time.    Telephone visits are billed at different rates depending on your insurance coverage. During this emergency period, for some insurers they may be billed the same as an in-person visit.  Please reach out to your insurance provider with any questions.    If during the course of the call the physician/provider feels a telephone visit is not appropriate, you will not be charged for this service.\"    Patient has given verbal consent for Telephone visit?  Yes    What phone number would you like to be contacted at? [phone]     How would you like to obtain your AVS? MyChart    During this virtual visit the patient is located in [place], patient verifies this as the location during the entirety of this visit.     Phone call duration: 23 minutes    Chino Ruffin MD      Gastroenterology Consult [facility]             Reason for Consultation:       Chief complaint: upper abdominal pain, nausea and vomiting.           ASSESSMENT AND RECOMMENDATIONS:   Assessment:  35 year old female with a history of abdominal pain, nausea, and vomiting, weight loss, morbid obesity, markedly enlarged liver on CT, EGD previously in early July showed retained food.  NM GES abnormal on Narcotics.     Recommendations:  Repeat EGD after 124 hour liquid fast.  Consider post viral gastroparesis vs severe fatty liver causing symptoms  Continue " "attempts to lose weight    Seek counseling for support for social changes  Continue therapy and medications for depression.             History of Present Illness:   Darryl Judd is a 35 year old female with a history of cervical disk disease on oxycodone, who developed new onset nausea vomiting associated with generalized upper abdominal pain.  The pain is severe, constant, made worse with eating, made better by nothing n particular.  She has lost, she estimates, about 15 lbs, but she is greater than 350 lbs at 6' 1\"- BMI 46.2.  She tells me she will not go back to the prior MD at [facility] since he \"nicked\" her when he did her EGD in July.  Indeed she was seen in the ED 4 times and in UC on one occasion over the last month.  She was complaining of melena after her was found to be FOBT negative and to have a preserved hemoglobin and at no time really dropped hemoglobin in a way that would be consisted with black stool from bleeding.  She also complained of pain at each of those visits.  She tells me she uses oxycodone for neck pain after an accident and that there are plans in place to fix her neck.  She has had diabetes II for 6-7 years and has recent AIC ranges from 6.8-7.8. She has a history of idiopathic DVT/PE and was maintained on DOAC this occurred in 11/2019.  She has stopped this and apparently \"doesn't have to take this medicine anymore\". It was recommended (see below) that she follow up with another EGD to complete the exam.     Her liver is markedly enlarge at 24 cm.  This appears to be fatty infiltration.   This has not been evaluated.      She smokes cigarretts and uses excedrin about 3 x a week.         EGD 07/16/2020   Attending MD: Aung Lynn MD Medical Record #: [id number]  ______________________________________________________________________________________________  Procedure:    Upper GI Endoscopy   Indications:    Abdominal Pain, failure to respond to treatment   Nausea or " Vomiting  Provider:        Aung Lynn MD   Referring MD: Referral Self   Primary MD:      Carmen Crabtree MD  Medications:  Admitting Medication:   0.9% Normal Saline at TKO   Intra Procedure Medications:   Patient received monitored anesthesia care.   Recovery Medications:   ondansetron hydrochloride (Zofran) 4 mg by SL   Complications: No immediate complications  ______________________________________________________________________________________________  Procedure:   An examination of the heart and lungs was performed within acceptable limits.  The patient was therefore deemed a reasonable candidate for endoscopy and monitored anesthesia care.  The risks and benefits were explained to the patient, who appeared to understand. After obtaining informed consent, the patient received monitored anesthesia care and I passed the scope.   Throughout the procedure the patient's blood pressure, pulse and oxygen saturations were monitored.  The scope was introduced through the mouth and advanced to the stomach.         Findings:   Esophagus:  Normal esophagus.   The z-line is 40 centimeters from the incisors.  Top of the gastric folds is 40 centimeters from the incisors.  Stomach:  The diaphragm hiatus is at 40 centimeters from the incisors.  *Stomach Comments:  Large amount of food debris and bile throughout the entire stomach precluding visualization.  Findings may represent some degree of gastroparesis or outlet obstruction. Procedure aborted.    Impression:   Nausea and vomiting, intractability of vomiting not specified, unspecified vomiting type  MD Impression Comments:    -Repeat EGD at the earliest convenience.  -Low fiber diet.  -Liquid diet ONLY for the entire day prior to the endoscopy.       CT ABDOMEN AND PELVIS WITHOUT AND WITH CONTRAST  DATE/TIME: 7/24/2020 1:11 AM    INDICATION: Abdominal pain. Gastrointestinal bleeding. Black stool.    COMPARISON: 2/9/2020.    TECHNIQUE: CT abdomen and pelvis prior to  the administration of intravenous contrast. CT angiogram abdomen and pelvis following the injection of IV contrast during arterial and renal excretory phases. 2D and 3D MIP reconstructions were performed by the   CT technologist. Dose reduction techniques were used.    CONTRAST: 100 mL iohexol.    FINDINGS:  GASTROINTESTINAL TRACT: The stomach, small and large bowel are nondilated. The appendix is unremarkable. No visualized bowel wall thickening, pneumatosis or free intraperitoneal gas. No visualized gastrointestinal bleeding.    LOWER CHEST: Unremarkable.    HEPATOBILIARY: Unremarkable.    SPLEEN: Mild splenomegaly. The spleen measures 19 cm in craniocaudal dimension.    PANCREAS: Unremarkable.    ADRENAL GLANDS: Indeterminate 2.0 cm left adrenal nodule measuring 20 Hounsfield units on unenhanced images 2/9/2020, unchanged.    KIDNEYS/BLADDER: A tiny focus of gas is present in the urinary bladder lumen.    LYMPH NODES: Unremarkable.              CT ABDOMEN PELVIS W   7/17/2020 12:44 AM    INDICATION: Abdominal pain.    COMPARISON: 2/9/2020.    TECHNIQUE: CT scan of the abdomen and pelvis was performed following injection  of IV contrast. Multiplanar reformats were obtained. Dose reduction techniques  were used.    CONTRAST: Iohexol 350 mg iodine/mL  mL bottle: 100 mL.    FINDINGS:   LOWER CHEST: Trace pericardial fluid or thickening also present previously.    HEPATOBILIARY: Enlarged liver, 24 cm craniocaudad with diffuse fatty  infiltration. No radiopaque gallstones or bile duct dilatation.    PANCREAS: Within normal limits.    SPLEEN: Enlarged measuring 19.5 cm craniocaudad. No focal lesion.    ADRENAL GLANDS: Normal.    KIDNEYS/BLADDER: Within normal limits. No hydronephrosis.    BOWEL: No obstruction or pneumatosis. There is some nonspecific fluid in the  colon. No free air or ascites.    LYMPH NODES: Normal.    VASCULATURE: Unremarkable. No abdominal aortic aneurysm.    PELVIC ORGANS:  Normal.    MUSCULOSKELETAL: Mild degenerative changes visualized spine.    IMPRESSION:   1.  Some nonspecific fluid in the colon could be due to enteritis. Correlate  clinically.    2.  No other acute findings.    3.  Hepatosplenomegaly. Hepatic steatosis.       NM GASTRIC EMPTYING  DATE/TIME: 7/3/2020 12:48 PM    INDICATION: Disease of stomach and duodenum, unspecified  COMPARISON: None.  TECHNIQUE: 1.0 mCi of technetium-99m sulfur colloid labeled egg product. Oral ingestion of standard meal. Anterior and posterior planar imaging for four hours. Geometric mean of emptying/retention calculated.    FINDINGS: The stomach is scintigraphically normal. No evidence of gastroesophageal reflux.    PATIENT'S RETENTION: 1 hour = 85%, 2 hours = 82%, 4 hours = 59%  NORMAL RETENTION: 1 hour = <90%, 2 hours = <60%, 4 hours = <10%                 Past Medical History:     History of pulmonary embolus (PE) 02/06/2020 November, 2019     Borderline personality disorder 06/25/2018   Violent behavior 10/12/2017   constipation 09/02/2017   Bipolar disorder, mixed 09/01/2017   Type 2 diabetes mellitus with hyperglycemia, with long-term current use of insulin 01/09/2017   Fatty liver disease, nonalcoholic 11/30/2016   Adenoma of left adrenal gland 11/21/2016   Overview:     Noted incidental on CT scan. 1.2 cm nodule, < 10 HU. Saw Dr. Ortiz; hormone testing all neg, advised no follow up necessary for imaging or testing.   Microalbuminuria 02/26/2015   Vitamin D deficiency 02/18/2015   Hyperuricemia 01/09/2015   Gout 01/09/2015   Tobacco use disorder 05/03/2012   Morbidly obese 02/09/2012   Hypertriglyceridemia 08/01/2011   Hypertension 05/04/2010   Mild mental retardation               Past Surgical History:     No past surgical history on file.         Previous Endoscopy:   No results found for this or any previous visit.         Social History:     Social History     Socioeconomic History     Marital status: Single     Spouse  name: None     Number of children: None     Years of education: None     Highest education level: None   Occupational History     None   Social Needs     Financial resource strain: None     Food insecurity     Worry: None     Inability: None     Transportation needs     Medical: None     Non-medical: None   Tobacco Use     Smoking status: Current Every Day Smoker     Packs/day: 0.50     Types: Cigarettes     Start date: 1/1/2003     Smokeless tobacco: Never Used   Substance and Sexual Activity     Alcohol use: Yes     Alcohol/week: 1.0 - 3.0 standard drinks     Types: 1 - 3 Shots of liquor per week     Frequency: 2-4 times a month     Drinks per session: 3 or 4     Binge frequency: Monthly     Drug use: Not Currently     Sexual activity: None   Lifestyle     Physical activity     Days per week: None     Minutes per session: None     Stress: None   Relationships     Social connections     Talks on phone: None     Gets together: None     Attends Denominational service: None     Active member of club or organization: None     Attends meetings of clubs or organizations: None     Relationship status: None     Intimate partner violence     Fear of current or ex partner: None     Emotionally abused: None     Physically abused: None     Forced sexual activity: None   Other Topics Concern     None   Social History Narrative     None            Family History:     No family history on file.  No known history of colorectal cancer, liver disease, or inflammatory bowel disease.         Allergies:   Reviewed and edited as appropriate     Allergies   Allergen Reactions     Diazepam Other (See Comments)     Blackout, memory loss       Buspirone Other (See Comments)     Suicidal - acted on it        Canagliflozin Other (See Comments)     Gabapentin Dizziness     Lorazepam Other (See Comments)     agitation  angry   overdose       Metformin Other (See Comments)     Too fast a weight loss - felt ill       Vancomycin Other (See Comments),  Nausea and Vomiting and GI Disturbance     Abdominal pain         Ciprofloxacin Rash     Metronidazole Rash     Mold Rash     Penicillins Rash     Pollen Extract Rash     Prednisone Other (See Comments) and Rash     Irritability  Urinary retention       Uncaria Tomentosa (Cats Claw) Rash            Medications:     Current Outpatient Medications   Medication Sig Dispense Refill     acetaminophen (TYLENOL) 500 MG tablet Take 1,000 mg by mouth       albuterol (PROAIR HFA/PROVENTIL HFA/VENTOLIN HFA) 108 (90 Base) MCG/ACT inhaler Inhale 1-2 puffs into the lungs       allopurinol (ZYLOPRIM) 300 MG tablet Take 300 mg by mouth       alum & mag hydroxide-simethicone (MAALOX) 200-200-20 MG/5ML SUSP suspension Take 30 mLs by mouth       aspirin-acetaminophen-caffeine (EXCEDRIN MIGRAINE) 250-250-65 MG tablet Take 1 tablet by mouth       blood glucose ([employer] TEST STRIPS) test strip Test 2 times per day.       blood glucose (PRECISION QID TEST) test strip 1 each       Blood Glucose Monitoring Suppl (FIFTY50 GLUCOSE METER 2.0) w/Device KIT Dispense meter, test strips, lancets covered by pt ins. E11.9 NIDDM type II - Test 1 time/day       dextromethorphan (DELSYM) 30 MG/5ML liquid Take 60 mg by mouth       dextromethorphan-guaiFENesin (SILTUSSIN DM WHITE)  MG/5ML liquid        famotidine (PEPCID) 40 MG tablet Take 20 mg by mouth       glipiZIDE (GLUCOTROL XL) 10 MG 24 hr tablet Take 10 mg by mouth       glucagon (GLUCAGON EMERGENCY) 1 MG kit Inject 1 mg Subcutaneous       hydrOXYzine (ATARAX) 25 MG tablet Take 25-50 mg by mouth       insulin detemir (LEVEMIR PEN) 100 UNIT/ML pen Inject 30 Units Subcutaneous       insulin pen needle (FIFTY50 PEN NEEDLES) 32G X 6 MM miscellaneous For administering insulin at home.       lamoTRIgine (LAMICTAL) 100 MG tablet Take 100 mg by mouth       Lancets 30G MISC Check blood glucose twice daily       lurasidone (LATUDA) 40 MG TABS tablet        magnesium oxide (MAG-OX) 400 MG tablet Take 400  mg by mouth       meclizine (ANTIVERT) 25 MG tablet Take 25 mg by mouth       melatonin 3 MG tablet Take 3 mg by mouth       metFORMIN (GLUCOPHAGE-XR) 500 MG 24 hr tablet Take 1,000 mg by mouth       metoprolol succinate ER (TOPROL-XL) 25 MG 24 hr tablet Take 25 mg by mouth       nicotine (NICODERM CQ) 21 MG/24HR 24 hr patch Place 1 patch onto the skin       ondansetron (ZOFRAN-ODT) 8 MG ODT tab Place 8 mg under the tongue       oxyCODONE (ROXICODONE) 5 MG tablet Take 5 mg by mouth       pantoprazole (PROTONIX) 20 MG EC tablet Take 20 mg by mouth       pioglitazone (ACTOS) 30 MG tablet Take 30 mg by mouth       prazosin (MINIPRESS) 1 MG capsule Take 1 mg by mouth       rivaroxaban ANTICOAGULANT (XARELTO) 20 MG TABS tablet Take 20 mg by mouth       sitagliptin (JANUVIA) 100 MG tablet Take 100 mg by mouth       sucralfate (CARAFATE) 1 GM tablet Take 1 g by mouth       Wound Dressings ([facility] WOUND/BURN DRESSING) gel Apply thin layer to wound once a day.       diphenhydrAMINE (BENADRYL) 25 MG tablet Take 25 mg by mouth               Review of Systems:     A complete 10 point review of systems was performed and is negative except as noted in the HPI           Physical Exam:   Ht 1.829 m (6')   Wt (!) 169.2 kg (373 lb)   BMI 50.59 kg/m    Wt:   Wt Readings from Last 2 Encounters:   08/17/20 (!) 169.2 kg (373 lb)      Constitutional: cooperative, pleasant, not dyspneic/diaphoretic, no acute distress  Sounds depressed, flattened affect.           Chino Ruffin MD  Associate Professor of Medicine  Division of Gastroenterology, Hepatology, and Nutrition  [facility]     (0) indicator not present

## 2021-01-07 ENCOUNTER — OFFICE VISIT (OUTPATIENT)
Dept: NEUROLOGY | Facility: CLINIC | Age: 36
End: 2021-01-07
Payer: COMMERCIAL

## 2021-01-07 VITALS
BODY MASS INDEX: 39.68 KG/M2 | DIASTOLIC BLOOD PRESSURE: 78 MMHG | WEIGHT: 293 LBS | HEART RATE: 100 BPM | HEIGHT: 72 IN | SYSTOLIC BLOOD PRESSURE: 132 MMHG

## 2021-01-07 DIAGNOSIS — E66.01 MORBID OBESITY (H): ICD-10-CM

## 2021-01-07 DIAGNOSIS — R56.9 SEIZURE-LIKE ACTIVITY (H): ICD-10-CM

## 2021-01-07 DIAGNOSIS — F07.81 POSTCONCUSSION SYNDROME: Primary | ICD-10-CM

## 2021-01-07 PROBLEM — K21.9 GASTROESOPHAGEAL REFLUX DISEASE WITHOUT ESOPHAGITIS: Status: ACTIVE | Noted: 2019-03-07

## 2021-01-07 PROBLEM — R45.851 SUICIDAL IDEATION: Status: ACTIVE | Noted: 2017-09-01

## 2021-01-07 PROBLEM — E11.9 TYPE 2 DIABETES MELLITUS WITHOUT COMPLICATION, WITHOUT LONG-TERM CURRENT USE OF INSULIN (H): Status: ACTIVE | Noted: 2017-01-09

## 2021-01-07 PROBLEM — R45.6 VIOLENT BEHAVIOR: Status: ACTIVE | Noted: 2017-10-12

## 2021-01-07 PROBLEM — F60.3 BORDERLINE PERSONALITY DISORDER (H): Status: ACTIVE | Noted: 2018-06-25

## 2021-01-07 PROBLEM — R42 DIZZINESS: Status: ACTIVE | Noted: 2017-11-25

## 2021-01-07 PROBLEM — R55 VASOVAGAL SYNCOPE: Status: ACTIVE | Noted: 2021-01-07

## 2021-01-07 PROBLEM — F31.63 BIPOLAR 1 DISORDER, MIXED, SEVERE (H): Status: ACTIVE | Noted: 2017-09-01

## 2021-01-07 PROBLEM — F41.1 GAD (GENERALIZED ANXIETY DISORDER): Status: ACTIVE | Noted: 2019-02-05

## 2021-01-07 PROBLEM — N30.00 ACUTE CYSTITIS WITHOUT HEMATURIA: Status: ACTIVE | Noted: 2021-01-07

## 2021-01-07 PROBLEM — E11.9 DIABETES (H): Status: ACTIVE | Noted: 2017-09-01

## 2021-01-07 PROCEDURE — 99205 OFFICE O/P NEW HI 60 MIN: CPT | Performed by: PSYCHIATRY & NEUROLOGY

## 2021-01-07 RX ORDER — FENOFIBRATE 160 MG/1
TABLET ORAL
COMMUNITY
Start: 2021-01-05

## 2021-01-07 SDOH — HEALTH STABILITY: MENTAL HEALTH: HOW MANY STANDARD DRINKS CONTAINING ALCOHOL DO YOU HAVE ON A TYPICAL DAY?: NOT ASKED

## 2021-01-07 SDOH — HEALTH STABILITY: MENTAL HEALTH: HOW OFTEN DO YOU HAVE 6 OR MORE DRINKS ON ONE OCCASION?: NOT ASKED

## 2021-01-07 ASSESSMENT — MIFFLIN-ST. JEOR: SCORE: 2576.49

## 2021-01-07 NOTE — NURSING NOTE
Chief Complaint   Patient presents with     Concussion     Hit head 4 weeks ago- constant headache, unable to eat as it causes emesis      Mimi Aragon CMA on 1/7/2021 at 10:49 AM

## 2021-01-07 NOTE — PROGRESS NOTES
NEUROLOGY CONSULTATION NOTE       [facility] NEUROLOGY [place]  [address] [place] [address]  Tel: [phone]  Fax: [phone]  [email or web address]     Darryl Judd,  1985, MRN [id number]  PCP: [place], [facility], [phone]  Date: 2021     ASSESSMENT & PLAN     Diagnosis code  1. Postconcussion syndrome  2. Seizure-like activity     Postconcussion syndrome  35-year-old morbidly obese female with bipolar disorder, generalized anxiety disorder with past history of suicidal attempts who had unexplained fall down the stairs and since then has been complaining of persistent headache.  I suspect she has postconcussion syndrome and although her CT of the head and cervical spine was normal, I have recommended checking MRI of the head.  Events leading up to the fall are somewhat murky and she is not sure how long she was at the bottom of the stairs and nobody witnessed that fall.  I have scheduled her for EEG.  Follow-up will be after above tests.    Thank you again for this referral, please feel free to contact me if you have any questions.    Castro Ahumada MD  [facility]  (Formerly, [facility]     REASON FOR CONSULTATION Concussion        HISTORY OF PRESENT ILLNESS     We have been requested by Dr. Priti chopra in [facility] to evaluate Darryl Judd who is a 35 year old  female for possible syncope and postconcussion syndrome    Patient is a morbidly obese 35-year-old female with history of bipolar disorder, generalized anxiety disorder, hypertension and diabetes who reports 4 weeks ago she fell down at her mom's house downstairs.  She does not recall if she lost consciousness or if she had a seizure.  Her mom found her and that she did hit her head.  Afterwards she developed headache.  These headaches are almost constant not associated with nausea vomiting.  She does have some balance  issues but denies any speech difficulty.  According to her since the fall she had persistent right-sided headache that tends to get worse with bright lights and loud noises.  Although she does have history of migraine this appears different than her typical migraine.  She was seen at emergency room and had CT of the head and cervical spine that was unremarkable and was asked to follow-up with neurology.     PROBLEM LIST   Patient Active Problem List   Diagnosis Code     Acute cystitis without hematuria N30.00     Adenoma of left adrenal gland D35.02     Anxiety state F41.1     Bipolar 1 disorder, mixed, severe (H) F31.63     Borderline personality disorder (H) F60.3     Type 2 diabetes mellitus without complication, without long-term current use of insulin (H) E11.9     Essential hypertension I10     Fatty liver disease, nonalcoholic K76.0     DAVID (generalized anxiety disorder) F41.1     Hanging, strangulation, or suffocation, undetermined intent [id number]     Hypertriglyceridemia E78.1     Gout M10.9     Dizziness R42     Recurrent major depressive disorder (H) F33.9     Vitamin D deficiency E55.9     Violent behavior R45.6     Vasovagal syncope R55     Suicidal ideation R45.851     Diabetes (H) E11.9     Gastroesophageal reflux disease without esophagitis K21.9     Mild intellectual disability F70         PAST MEDICAL & SURGICAL HISTORY     Past Medical History:   Patient  has no past medical history on file.    Surgical History:  She  has no past surgical history on file.     SOCIAL HISTORY     Reviewed, and she  reports that she has been smoking cigarettes. She started smoking about 18 years ago. She has been smoking about 0.50 packs per day. She has never used smokeless tobacco. She reports previous alcohol use of about 1.0 - 3.0 standard drinks of alcohol per week. She reports previous drug use.     FAMILY HISTORY     Reviewed, and family history includes Alcoholism in her mother; Depression in her mother;  Diabetes in her mother; Heart Disease in her maternal grandfather; Hypertension in her mother; Migraines in her mother.     ALLERGIES     Allergies   Allergen Reactions     Diazepam Other (See Comments)     Blackout, memory loss       Buspirone Other (See Comments)     Suicidal - acted on it        Canagliflozin Other (See Comments)     Gabapentin Dizziness     Lorazepam Other (See Comments)     agitation  angry   overdose       Metformin Other (See Comments)     Too fast a weight loss - felt ill       Vancomycin Other (See Comments), Nausea and Vomiting and GI Disturbance     Abdominal pain         Ciprofloxacin Rash     Metronidazole Rash     Mold Rash     Penicillins Rash     Pollen Extract Rash     Prednisone Other (See Comments) and Rash     Irritability  Urinary retention       Uncaria Tomentosa (Cats Claw) Rash         REVIEW OF SYSTEMS     A 12 point review of system was performed and was negative except as outlined in the history of present illness.     HOME MEDICATIONS       Current Outpatient Medications:      acetaminophen (TYLENOL) 500 MG tablet, Take 1,000 mg by mouth, Disp: , Rfl:      albuterol (PROAIR HFA/PROVENTIL HFA/VENTOLIN HFA) 108 (90 Base) MCG/ACT inhaler, Inhale 1-2 puffs into the lungs, Disp: , Rfl:      allopurinol (ZYLOPRIM) 300 MG tablet, Take 300 mg by mouth, Disp: , Rfl:      alum & mag hydroxide-simethicone (MAALOX) 200-200-20 MG/5ML SUSP suspension, Take 30 mLs by mouth, Disp: , Rfl:      aspirin-acetaminophen-caffeine (EXCEDRIN MIGRAINE) 250-250-65 MG tablet, Take 1 tablet by mouth, Disp: , Rfl:      blood glucose (KROGER TEST STRIPS) test strip, Test 2 times per day., Disp: , Rfl:      blood glucose (PRECISION QID TEST) test strip, 1 each, Disp: , Rfl:      Blood Glucose Monitoring Suppl (FIFTY50 GLUCOSE METER 2.0) w/Device KIT, Dispense meter, test strips, lancets covered by pt ins. E11.9 NIDDM type II - Test 1 time/day, Disp: , Rfl:      famotidine (PEPCID) 40 MG tablet, Take 20  mg by mouth, Disp: , Rfl:      fenofibrate (TRIGLIDE/LOFIBRA) 160 MG tablet, , Disp: , Rfl:      glipiZIDE (GLUCOTROL XL) 10 MG 24 hr tablet, Take 10 mg by mouth, Disp: , Rfl:      glucagon (GLUCAGON EMERGENCY) 1 MG kit, Inject 1 mg Subcutaneous, Disp: , Rfl:      hydrOXYzine (ATARAX) 25 MG tablet, Take 25-50 mg by mouth, Disp: , Rfl:      insulin detemir (LEVEMIR PEN) 100 UNIT/ML pen, Inject 30 Units Subcutaneous, Disp: , Rfl:      insulin pen needle (FIFTY50 PEN NEEDLES) 32G X 6 MM miscellaneous, For administering insulin at home., Disp: , Rfl:      lamoTRIgine (LAMICTAL) 100 MG tablet, Take 100 mg by mouth, Disp: , Rfl:      Lancets 30G MISC, Check blood glucose twice daily, Disp: , Rfl:      lurasidone (LATUDA) 40 MG TABS tablet, , Disp: , Rfl:      magnesium oxide (MAG-OX) 400 MG tablet, Take 400 mg by mouth, Disp: , Rfl:      meclizine (ANTIVERT) 25 MG tablet, Take 25 mg by mouth, Disp: , Rfl:      metFORMIN (GLUCOPHAGE-XR) 500 MG 24 hr tablet, Take 1,000 mg by mouth, Disp: , Rfl:      metoprolol succinate ER (TOPROL-XL) 25 MG 24 hr tablet, Take 25 mg by mouth, Disp: , Rfl:      ondansetron (ZOFRAN-ODT) 8 MG ODT tab, Place 8 mg under the tongue, Disp: , Rfl:      oxyCODONE (ROXICODONE) 5 MG tablet, Take 5 mg by mouth, Disp: , Rfl:      pantoprazole (PROTONIX) 20 MG EC tablet, Take 20 mg by mouth, Disp: , Rfl:      pioglitazone (ACTOS) 30 MG tablet, Take 30 mg by mouth, Disp: , Rfl:      prazosin (MINIPRESS) 1 MG capsule, Take 1 mg by mouth, Disp: , Rfl:      sitagliptin (JANUVIA) 100 MG tablet, Take 100 mg by mouth, Disp: , Rfl:      sucralfate (CARAFATE) 1 GM tablet, Take 1 g by mouth, Disp: , Rfl:       PHYSICAL EXAM     Vital signs  /78 (BP Location: Right arm, Patient Position: Sitting)   Pulse 100   Ht 1.829 m (6')   Wt (!) 176.4 kg (389 lb)   BMI 52.76 kg/m      Weight:   389 lbs 0 oz    GENERAL PHYSICAL EXAM: Patient is alert and oriented x 4 in no acute distress. Neck was supple, no carotid  bruits, thyromegaly, lymphadenopathy or JVD noted.   NEUROLOGICAL EXAM:  Mental Status  Patient is A&O x 4. Patient recalls 3/3 objects at 5 minutes.  Speech  Speech is clear and fluent with good repetition, comprehension, and naming both for objects and parts of an object. Written and verbal comprehension is intact.  Cranial Nerves  CN II: Visual fields are full to confrontation. Fundoscopic exam is normal with sharp discs and no vascular changes. Venous pulsations are present bilaterally. Pupils are equal and reactive to light.   CN III, IV, VI: EOMI, PERRLA  CN V: Facial sensation is intact to pinprick in all 3 divisions bilaterally. Corneal responses are intact.  CN VII: Face is symmetric with normal eye closure and smile.  CN VII: Hearing is normal to rubbing fingers  CN IX, X: Palate elevates symmetrically. Phonation is normal.  CN XI: Head turning and shoulder shrug are intact  CN XII: Tongue is midline with normal movements and no atrophy.  Motor Exam  Muscle bulk and tone are normal. No pronator drift. Strength is 5/5 bilaterally. No fasciculations noted.  Reflexes  Reflexes are 2+ and symmetric at the biceps, triceps, knees, and ankles. Plantar responses are flexor.  Sensory Exam  Light touch, pinprick, position sense, and vibration sense are intact bilaterally. No astereognosia, agraphesthesia or extinction to bilateral simultaneous stimulation.  Coordination  Rapid alternating movements and fine finger movements are intact. No dysmetria on FNF and HKS. Romberg negative.  Gait  Posture is normal.Tandem gait is normal. Able to walk on toes and heels.     DIAGNOSTIC STUDIES     PERTINENT RADIOLOGY  Following imaging studies were reviewed:     CT BRAIN AND CERVICAL SPINE  HEAD CT:  1.  Normal head CT.    CERVICAL SPINE CT:  1.  Moderately limited by motion. No definite acute fracture.     PERTINENT LABS  Following labs were reviewed:  Basic Metabolic PanelResulted: 12/31/2020 6:56 PM  [facility]  System  Component Name Value Ref Range   Sodium 140 136 - 145 mmol/L   Potassium 4.4 3.5 - 5 mmol/L   Chloride 103 98 - 107 mmol/L   CO2 25 22 - 31 mmol/L   Anion Gap, Calculation 12 5 - 18 mmol/L   Glucose 163 (H) 70 - 125 mg/dL   Calcium 9.4 8.5 - 10.5 mg/dL   BUN 18 8 - 22 mg/dL   Creatinine 1.22 (H) 0.6 - 1.1 mg/dL   GFR MDRD Af Amer >60 >60 mL/min/1.73m2   GFR MDRD Non Af Amer 50 (L) >60 mL/min/1.73m2   Specimen Collected on   Blood 12/31/2020 5:35 PM                 Total time spent for face to face visit, reviewing labs/imaging studies, counseling and coordination of care was: 1 Hour 15 Minutes spent on the date of the encounter doing chart review, review of outside records, review of test results, interpretation of tests, patient visit and documentation       This note was dictated using voice recognition software.  Any grammatical or context distortions are unintentional and inherent to the software.      No

## 2021-01-07 NOTE — LETTER
2021         RE: Darryl Judd  [address] [place] [address]        Dear Colleague,    Thank you for referring your patient, Darryl Judd, to the [facility] NEUROLOGY CLINIC [place]. Please see a copy of my visit note below.    NEUROLOGY CONSULTATION NOTE       [facility] NEUROLOGY [place]  [address] [place] [address]  Tel: [phone]  Fax: [phone]  [email or web address]     Darryl Judd,  1985, MRN [id number]  PCP: [place], [facility], [phone]  Date: 2021     ASSESSMENT & PLAN     Diagnosis code  1. Postconcussion syndrome  2. Seizure-like activity     Postconcussion syndrome  35-year-old morbidly obese female with bipolar disorder, generalized anxiety disorder with past history of suicidal attempts who had unexplained fall down the stairs and since then has been complaining of persistent headache.  I suspect she has postconcussion syndrome and although her CT of the head and cervical spine was normal, I have recommended checking MRI of the head.  Events leading up to the fall are somewhat murky and she is not sure how long she was at the bottom of the stairs and nobody witnessed that fall.  I have scheduled her for EEG.  Follow-up will be after above tests.    Thank you again for this referral, please feel free to contact me if you have any questions.    Castro Ahumada MD  [facility] NEUROLOGY, [place]  (Formerly, [facility]     REASON FOR CONSULTATION Concussion        HISTORY OF PRESENT ILLNESS     We have been requested by Dr. Priti chopra in [facility] to evaluate Darryl Judd who is a 35 year old  female for possible syncope and postconcussion syndrome    Patient is a morbidly obese 35-year-old female with history of bipolar disorder, generalized anxiety disorder, hypertension and diabetes who reports 4 weeks ago she fell down at her mom's house  downstairs.  She does not recall if she lost consciousness or if she had a seizure.  Her mom found her and that she did hit her head.  Afterwards she developed headache.  These headaches are almost constant not associated with nausea vomiting.  She does have some balance issues but denies any speech difficulty.  According to her since the fall she had persistent right-sided headache that tends to get worse with bright lights and loud noises.  Although she does have history of migraine this appears different than her typical migraine.  She was seen at emergency room and had CT of the head and cervical spine that was unremarkable and was asked to follow-up with neurology.     PROBLEM LIST   Patient Active Problem List   Diagnosis Code     Acute cystitis without hematuria N30.00     Adenoma of left adrenal gland D35.02     Anxiety state F41.1     Bipolar 1 disorder, mixed, severe (H) F31.63     Borderline personality disorder (H) F60.3     Type 2 diabetes mellitus without complication, without long-term current use of insulin (H) E11.9     Essential hypertension I10     Fatty liver disease, nonalcoholic K76.0     DAVID (generalized anxiety disorder) F41.1     Hanging, strangulation, or suffocation, undetermined intent [id number]     Hypertriglyceridemia E78.1     Gout M10.9     Dizziness R42     Recurrent major depressive disorder (H) F33.9     Vitamin D deficiency E55.9     Violent behavior R45.6     Vasovagal syncope R55     Suicidal ideation R45.851     Diabetes (H) E11.9     Gastroesophageal reflux disease without esophagitis K21.9     Mild intellectual disability F70         PAST MEDICAL & SURGICAL HISTORY     Past Medical History:   Patient  has no past medical history on file.    Surgical History:  She  has no past surgical history on file.     SOCIAL HISTORY     Reviewed, and she  reports that she has been smoking cigarettes. She started smoking about 18 years ago. She has been smoking about 0.50 packs per day. She  has never used smokeless tobacco. She reports previous alcohol use of about 1.0 - 3.0 standard drinks of alcohol per week. She reports previous drug use.     FAMILY HISTORY     Reviewed, and family history includes Alcoholism in her mother; Depression in her mother; Diabetes in her mother; Heart Disease in her maternal grandfather; Hypertension in her mother; Migraines in her mother.     ALLERGIES     Allergies   Allergen Reactions     Diazepam Other (See Comments)     Blackout, memory loss       Buspirone Other (See Comments)     Suicidal - acted on it        Canagliflozin Other (See Comments)     Gabapentin Dizziness     Lorazepam Other (See Comments)     agitation  angry   overdose       Metformin Other (See Comments)     Too fast a weight loss - felt ill       Vancomycin Other (See Comments), Nausea and Vomiting and GI Disturbance     Abdominal pain         Ciprofloxacin Rash     Metronidazole Rash     Mold Rash     Penicillins Rash     Pollen Extract Rash     Prednisone Other (See Comments) and Rash     Irritability  Urinary retention       Uncaria Tomentosa (Cats Claw) Rash         REVIEW OF SYSTEMS     A 12 point review of system was performed and was negative except as outlined in the history of present illness.     HOME MEDICATIONS       Current Outpatient Medications:      acetaminophen (TYLENOL) 500 MG tablet, Take 1,000 mg by mouth, Disp: , Rfl:      albuterol (PROAIR HFA/PROVENTIL HFA/VENTOLIN HFA) 108 (90 Base) MCG/ACT inhaler, Inhale 1-2 puffs into the lungs, Disp: , Rfl:      allopurinol (ZYLOPRIM) 300 MG tablet, Take 300 mg by mouth, Disp: , Rfl:      alum & mag hydroxide-simethicone (MAALOX) 200-200-20 MG/5ML SUSP suspension, Take 30 mLs by mouth, Disp: , Rfl:      aspirin-acetaminophen-caffeine (EXCEDRIN MIGRAINE) 250-250-65 MG tablet, Take 1 tablet by mouth, Disp: , Rfl:      blood glucose ([employer]OGER TEST STRIPS) test strip, Test 2 times per day., Disp: , Rfl:      blood glucose (PRECISION QID TEST)  test strip, 1 each, Disp: , Rfl:      Blood Glucose Monitoring Suppl (FIFTY50 GLUCOSE METER 2.0) w/Device KIT, Dispense meter, test strips, lancets covered by pt ins. E11.9 NIDDM type II - Test 1 time/day, Disp: , Rfl:      famotidine (PEPCID) 40 MG tablet, Take 20 mg by mouth, Disp: , Rfl:      fenofibrate (TRIGLIDE/LOFIBRA) 160 MG tablet, , Disp: , Rfl:      glipiZIDE (GLUCOTROL XL) 10 MG 24 hr tablet, Take 10 mg by mouth, Disp: , Rfl:      glucagon (GLUCAGON EMERGENCY) 1 MG kit, Inject 1 mg Subcutaneous, Disp: , Rfl:      hydrOXYzine (ATARAX) 25 MG tablet, Take 25-50 mg by mouth, Disp: , Rfl:      insulin detemir (LEVEMIR PEN) 100 UNIT/ML pen, Inject 30 Units Subcutaneous, Disp: , Rfl:      insulin pen needle (FIFTY50 PEN NEEDLES) 32G X 6 MM miscellaneous, For administering insulin at home., Disp: , Rfl:      lamoTRIgine (LAMICTAL) 100 MG tablet, Take 100 mg by mouth, Disp: , Rfl:      Lancets 30G MISC, Check blood glucose twice daily, Disp: , Rfl:      lurasidone (LATUDA) 40 MG TABS tablet, , Disp: , Rfl:      magnesium oxide (MAG-OX) 400 MG tablet, Take 400 mg by mouth, Disp: , Rfl:      meclizine (ANTIVERT) 25 MG tablet, Take 25 mg by mouth, Disp: , Rfl:      metFORMIN (GLUCOPHAGE-XR) 500 MG 24 hr tablet, Take 1,000 mg by mouth, Disp: , Rfl:      metoprolol succinate ER (TOPROL-XL) 25 MG 24 hr tablet, Take 25 mg by mouth, Disp: , Rfl:      ondansetron (ZOFRAN-ODT) 8 MG ODT tab, Place 8 mg under the tongue, Disp: , Rfl:      oxyCODONE (ROXICODONE) 5 MG tablet, Take 5 mg by mouth, Disp: , Rfl:      pantoprazole (PROTONIX) 20 MG EC tablet, Take 20 mg by mouth, Disp: , Rfl:      pioglitazone (ACTOS) 30 MG tablet, Take 30 mg by mouth, Disp: , Rfl:      prazosin (MINIPRESS) 1 MG capsule, Take 1 mg by mouth, Disp: , Rfl:      sitagliptin (JANUVIA) 100 MG tablet, Take 100 mg by mouth, Disp: , Rfl:      sucralfate (CARAFATE) 1 GM tablet, Take 1 g by mouth, Disp: , Rfl:       PHYSICAL EXAM     Vital signs  /78 (BP  Location: Right arm, Patient Position: Sitting)   Pulse 100   Ht 1.829 m (6')   Wt (!) 176.4 kg (389 lb)   BMI 52.76 kg/m      Weight:   389 lbs 0 oz    GENERAL PHYSICAL EXAM: Patient is alert and oriented x 4 in no acute distress. Neck was supple, no carotid bruits, thyromegaly, lymphadenopathy or JVD noted.   NEUROLOGICAL EXAM:  Mental Status  Patient is A&O x 4. Patient recalls 3/3 objects at 5 minutes.  Speech  Speech is clear and fluent with good repetition, comprehension, and naming both for objects and parts of an object. Written and verbal comprehension is intact.  Cranial Nerves  CN II: Visual fields are full to confrontation. Fundoscopic exam is normal with sharp discs and no vascular changes. Venous pulsations are present bilaterally. Pupils are equal and reactive to light.   CN III, IV, VI: EOMI, PERRLA  CN V: Facial sensation is intact to pinprick in all 3 divisions bilaterally. Corneal responses are intact.  CN VII: Face is symmetric with normal eye closure and smile.  CN VII: Hearing is normal to rubbing fingers  CN IX, X: Palate elevates symmetrically. Phonation is normal.  CN XI: Head turning and shoulder shrug are intact  CN XII: Tongue is midline with normal movements and no atrophy.  Motor Exam  Muscle bulk and tone are normal. No pronator drift. Strength is 5/5 bilaterally. No fasciculations noted.  Reflexes  Reflexes are 2+ and symmetric at the biceps, triceps, knees, and ankles. Plantar responses are flexor.  Sensory Exam  Light touch, pinprick, position sense, and vibration sense are intact bilaterally. No astereognosia, agraphesthesia or extinction to bilateral simultaneous stimulation.  Coordination  Rapid alternating movements and fine finger movements are intact. No dysmetria on FNF and HKS. Romberg negative.  Gait  Posture is normal.Tandem gait is normal. Able to walk on toes and heels.     DIAGNOSTIC STUDIES     PERTINENT RADIOLOGY  Following imaging studies were reviewed:     CT  BRAIN AND CERVICAL SPINE  HEAD CT:  1.  Normal head CT.    CERVICAL SPINE CT:  1.  Moderately limited by motion. No definite acute fracture.     PERTINENT LABS  Following labs were reviewed:  Basic Metabolic PanelResulted: 12/31/2020 6:56 PM  [facility]  Component Name Value Ref Range   Sodium 140 136 - 145 mmol/L   Potassium 4.4 3.5 - 5 mmol/L   Chloride 103 98 - 107 mmol/L   CO2 25 22 - 31 mmol/L   Anion Gap, Calculation 12 5 - 18 mmol/L   Glucose 163 (H) 70 - 125 mg/dL   Calcium 9.4 8.5 - 10.5 mg/dL   BUN 18 8 - 22 mg/dL   Creatinine 1.22 (H) 0.6 - 1.1 mg/dL   GFR MDRD Af Amer >60 >60 mL/min/1.73m2   GFR MDRD Non Af Amer 50 (L) >60 mL/min/1.73m2   Specimen Collected on   Blood 12/31/2020 5:35 PM                 Total time spent for face to face visit, reviewing labs/imaging studies, counseling and coordination of care was: 1 Hour 15 Minutes spent on the date of the encounter doing chart review, review of outside records, review of test results, interpretation of tests, patient visit and documentation       This note was dictated using voice recognition software.  Any grammatical or context distortions are unintentional and inherent to the software.       Again, thank you for allowing me to participate in the care of your patient.        Sincerely,        Castro Ahumada MD

## 2021-01-14 ENCOUNTER — RECORDS - HEALTHEAST (OUTPATIENT)
Dept: NEUROLOGY | Facility: CLINIC | Age: 36
End: 2021-01-14

## 2021-01-14 DIAGNOSIS — R56.9 SEIZURE-LIKE ACTIVITY (H): ICD-10-CM

## 2021-01-14 DIAGNOSIS — F07.81 POSTCONCUSSION SYNDROME: ICD-10-CM

## 2021-02-10 ENCOUNTER — COMMUNICATION - HEALTHEAST (OUTPATIENT)
Dept: SCHEDULING | Facility: CLINIC | Age: 36
End: 2021-02-10

## 2021-04-30 ENCOUNTER — HOSPITAL ENCOUNTER (EMERGENCY)
Dept: EMERGENCY MEDICINE | Facility: CLINIC | Age: 36
Discharge: HOME | End: 2021-04-30
Attending: EMERGENCY MEDICINE
Payer: COMMERCIAL

## 2021-04-30 ENCOUNTER — HOSPITAL ENCOUNTER (EMERGENCY)
Dept: EMERGENCY MEDICINE | Facility: CLINIC | Age: 36
Discharge: LEFT BEFORE DISCHARGE | End: 2021-04-30
Payer: COMMERCIAL
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
## 2021-04-30 LAB
POC PREG URINE (HCG) HE - HISTORICAL: NEGATIVE
POCT KIT EXPIRATION DATE HE - HISTORICAL: NORMAL
POCT KIT LOT NUMBER HE - HISTORICAL: NORMAL
POCT NEGATIVE CONTROL HE - HISTORICAL: NORMAL
POCT POSITIVE CONTROL HE - HISTORICAL: NORMAL

## 2021-06-02 ENCOUNTER — RECORDS - HEALTHEAST (OUTPATIENT)
Dept: ADMINISTRATIVE | Facility: CLINIC | Age: 36
End: 2021-06-02

## 2021-06-03 NOTE — TELEPHONE ENCOUNTER
Left message to call back for: Darryl  Information to relay to patient:  Pt coming in for weight loss intake Thursday and I left a message to make sure to come early for paper work or to make sure she creates a my chart. Stated this is done electronically and will need to be done before her appt. Please advise pt if she calls back.

## 2021-06-05 VITALS — WEIGHT: 293 LBS | BODY MASS INDEX: 53.98 KG/M2

## 2021-06-05 VITALS — BODY MASS INDEX: 53.98 KG/M2 | WEIGHT: 293 LBS

## 2021-06-17 NOTE — ED TRIAGE NOTES
Pt mother came up to this RN and is asking why pt is not in a room and why cant she walk, RN explained to mother that pt had imaging done which was fine and was walking while in dept so this RN not sure why pt unable to walk now. Pt mother asking why pt is not in a room, was told that there is a wait and no rooms open, mother states she is going to call an ambulance to have pt transported to another hospital, was told EMS will not  from entrance by security as pt and mother now out front of ER entrance and smoking,

## 2021-06-17 NOTE — ED NOTES
"Pt put on call light and RN went into room pt c/o pain states worse since oxycodone and then states \"my mom just got off the phone and she is ready to come up here and raise hell if you don't start giving me something for this pain.\" RN told pt that would notify MD of  Pain and then states \"you better notify security I am going to start tearing shit up in here if I don't get more for this pain.\" MD notified. security notified and at door. Pt continues to refuse to go to [facility] at [facility] time.     "
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
## 2021-06-17 NOTE — ED NOTES
Pt placed in c collar per MD and then am,buated to bathroom for sample per MD ok for pt to ambulate with collar on.

## 2021-06-17 NOTE — ED TRIAGE NOTES
PD called EMS as pt mother and pt state they want to go to another hospital for second opinion so PD called EMS to transport pt per pt and family request. Refused to sign AMA form, mother very rude to RN.

## 2021-06-17 NOTE — ED NOTES
Pt went over to CT and refused to lay on table stated hurt too bad, pt wanting pain meds prior to having to do this. Pt brought back to room and given Oxycodone as ordered by MD.
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
## 2021-06-24 NOTE — TELEPHONE ENCOUNTER
Heart monitor and shower         Call from pt re heart monitor       Can I take a shower ?      [phone]           Braydon Reynolds, RN   Triage and Medication Refills    Reason for Disposition    Nursing judgment or information in reference    Protocols used: NO GUIDELINE [id number]